# Patient Record
Sex: FEMALE | Race: WHITE | NOT HISPANIC OR LATINO
[De-identification: names, ages, dates, MRNs, and addresses within clinical notes are randomized per-mention and may not be internally consistent; named-entity substitution may affect disease eponyms.]

---

## 2021-09-30 PROBLEM — Z00.00 ENCOUNTER FOR PREVENTIVE HEALTH EXAMINATION: Status: ACTIVE | Noted: 2021-09-30

## 2021-10-04 ENCOUNTER — NON-APPOINTMENT (OUTPATIENT)
Age: 31
End: 2021-10-04

## 2021-10-04 ENCOUNTER — APPOINTMENT (OUTPATIENT)
Dept: RHEUMATOLOGY | Facility: CLINIC | Age: 31
End: 2021-10-04
Payer: COMMERCIAL

## 2021-10-04 VITALS
WEIGHT: 140 LBS | HEART RATE: 71 BPM | HEIGHT: 68 IN | TEMPERATURE: 97.8 F | OXYGEN SATURATION: 100 % | SYSTOLIC BLOOD PRESSURE: 97 MMHG | BODY MASS INDEX: 21.22 KG/M2 | DIASTOLIC BLOOD PRESSURE: 60 MMHG

## 2021-10-04 DIAGNOSIS — Z83.3 FAMILY HISTORY OF DIABETES MELLITUS: ICD-10-CM

## 2021-10-04 DIAGNOSIS — Z78.9 OTHER SPECIFIED HEALTH STATUS: ICD-10-CM

## 2021-10-04 DIAGNOSIS — M25.519 PAIN IN UNSPECIFIED SHOULDER: ICD-10-CM

## 2021-10-04 DIAGNOSIS — M54.9 DORSALGIA, UNSPECIFIED: ICD-10-CM

## 2021-10-04 PROCEDURE — 99204 OFFICE O/P NEW MOD 45 MIN: CPT

## 2021-10-04 NOTE — PHYSICAL EXAM
[General Appearance - Alert] : alert [General Appearance - In No Acute Distress] : in no acute distress [General Appearance - Well-Appearing] : healthy appearing [Sclera] : the sclera and conjunctiva were normal [Respiration, Rhythm And Depth] : normal respiratory rhythm and effort [Exaggerated Use Of Accessory Muscles For Inspiration] : no accessory muscle use [Edema] : there was no peripheral edema [No Spinal Tenderness] : no spinal tenderness [Abnormal Walk] : normal gait [Nail Clubbing] : no clubbing  or cyanosis of the fingernails [Musculoskeletal - Swelling] : no joint swelling seen [Motor Tone] : muscle strength and tone were normal [] : no rash [Skin Lesions] : no skin lesions [Oriented To Time, Place, And Person] : oriented to person, place, and time [Impaired Insight] : insight and judgment were intact [Affect] : the affect was normal [FreeTextEntry1] : No active synovitis of the upper and lower extremities bilaterally.

## 2021-10-04 NOTE — ASSESSMENT
[FreeTextEntry1] : 30 year old woman referred for rheumatology evaluation.  Patient with left shoulder and left sided neck pain for the past year, with some benefit from physical therapy and acupuncture, although still persists.  Review of imaging with some disc disease of C2-C7 in addition nondisplaced labral tear and mild bursitis of the left shoulder.  Labs completed in September 2021, with RF negative, YAIR negative, ESR and CRP in the normal range.  CCP not included in previous labs but given MRI findings without inflammatory changes and CRP and ESR in the normal, less likely pain related to an underlying inflammatory arthropathy.  Patient will have CCP completed with next blood tests if symptoms persist.  Would continue PT, trial of NSAIDs for two weeks with food, discussed Aleve bid with food, continue to follow up sports medicine as well.

## 2021-10-04 NOTE — HISTORY OF PRESENT ILLNESS
[FreeTextEntry1] : 30 year old woman referred for rheumatology evaluation\par Left sided neck and left shoulder pain\par pain present all the time\par Was seen by orthopedist\par \par Completed PT x 2 \par In January 2021, completed first course of PT, second course in process at this time.\par Currently in PT but due to cost limited\par \par Acupuncture every week, helpful as relieves spasm, but does not resolve the pain\par No clear injury noted\par No morning stiffness, no swollen joints\par Has been working from home at computer\par Feels posterior lower shoulder area pain which wraps around to the front\par MRI of the neck and left shoulder completed previously, reviewed results with patient.\par \par Was prescribed oxycodone\par muscle relaxer\par No nsaids, minimally ibuprofen at home\par No steroid injections\par No other joints involved, right arm dominant\par Limits activities, yoga, less able to lift things with left arm\par \par No history of psoriasis\par No family history of RA\par \par Of note, March 2020 had covid infection\par Was seen two weeks ago by [facility] at [place], told similar symptoms in others who had Covid in March 2020\par Had blood tests completed at that time, reviewed on phone\par Also seeing naturopath, taking supplements, reviewed labs on phone\par Taking a thyroid supplement as well

## 2021-10-04 NOTE — REVIEW OF SYSTEMS
[Arthralgias] : arthralgias [Negative] : Heme/Lymph [Joint Swelling] : no joint swelling [Joint Stiffness] : no joint stiffness [Limb Swelling] : no limb swelling

## 2021-10-04 NOTE — DATA REVIEWED
[FreeTextEntry1] : Reviewed on phone:\par CBC norm\par anti TPO 7.0\par ebv igg +\par CMP nml\par Lyme neg\par A1c 5.0%\par TSH 3.25\par YAIR neg\par RF neg\par CRP 0.5\par \par [facility]:\par ESR 2\par normal\par

## 2021-10-04 NOTE — CONSULT LETTER
[Dear  ___] : Dear  [unfilled], [Consult Letter:] : I had the pleasure of evaluating your patient, [unfilled]. [Please see my note below.] : Please see my note below. [Consult Closing:] : Thank you very much for allowing me to participate in the care of this patient.  If you have any questions, please do not hesitate to contact me. [Sincerely,] : Sincerely, [FreeTextEntry3] : Porsche Borrego MD, MPH

## 2023-11-20 ENCOUNTER — NON-APPOINTMENT (OUTPATIENT)
Age: 33
End: 2023-11-20